# Patient Record
Sex: FEMALE | Race: WHITE
[De-identification: names, ages, dates, MRNs, and addresses within clinical notes are randomized per-mention and may not be internally consistent; named-entity substitution may affect disease eponyms.]

---

## 2017-10-25 ENCOUNTER — HOSPITAL ENCOUNTER (EMERGENCY)
Dept: HOSPITAL 10 - FTE | Age: 22
Discharge: HOME | End: 2017-10-25
Payer: COMMERCIAL

## 2017-10-25 VITALS
WEIGHT: 137.79 LBS | HEIGHT: 67 IN | BODY MASS INDEX: 21.63 KG/M2 | WEIGHT: 137.79 LBS | BODY MASS INDEX: 21.63 KG/M2 | HEIGHT: 67 IN

## 2017-10-25 DIAGNOSIS — K29.70: Primary | ICD-10-CM

## 2017-10-25 LAB — URINE BLOOD (DIP) POC: (no result)

## 2017-10-25 PROCEDURE — 99283 EMERGENCY DEPT VISIT LOW MDM: CPT

## 2017-10-25 PROCEDURE — 81003 URINALYSIS AUTO W/O SCOPE: CPT

## 2017-10-25 NOTE — ERD
ER Documentation


Chief Complaint


Chief Complaint


UPPER LT ABD PAIN





HPI


22-year-old  female, previously healthy, presents to the emergency 

department with her mother, complaining of 1 week of intermittent abdominal pain

, located on the left upper quadrant, described as sharp and sometimes burning.

  The pain is 5 out of 10, and it seems that it gets worse by stress.  The 

patient denies fever, chills, nausea, diarrhea or vomiting.  No urinary 

symptoms.  No treatment attempted at this time





ROS


SYSTEMIC symptoms:  No fever, no chills, no night sweats 


EYE symptoms:   No eyesight problems.


OTOLARYNGEAL symptoms:   No hearing loss.


CARDIOVASCULAR symptoms:   No chest pain or discomfort, no palpitations.


PULMONARY symptoms:   No dyspnea, no cough, no wheezing.


GASTROINTESTINAL symptoms:   abdominal pain, no nausea, no vomiting 


SKIN no rashes


MUSCULOSKELETAL symptoms:   No arthralgias, no muscle aches.


NEUROLOGY symptoms: No headache, no confusion, no syncope, no numbness or 

tingling.


All systems reviewed and are negative except as per history of present illness.





Medications


Home Meds


Active Scripts


Acetaminophen* (Tylenol*) 325 Mg Tablet, 1 TAB PO Q6 Y for PAIN AND OR ELEVATED 

TEMP, #20 TAB


   Prov:PELON GIBSON MD         10/25/17


Ranitidine Hcl* (Zantac*) 150 Mg Tablet, 150 MG PO BID Y for EPIGASTRIC PAIN, #

30 TAB


   Prov:PELON GIBSON MD         10/25/17





Allergies


Allergies:  


Coded Allergies:  


     No Known Allergy (Unverified , 10/25/17)





Physical Exam


Vitals





Vital Signs








  Date Time  Temp Pulse Resp B/P Pulse Ox O2 Delivery O2 Flow Rate FiO2


 


10/25/17 08:48 98.1 100 17 128/76 98   








Physical Exam


Patient is in no acute distress, vital signs stable. Alert and fully oriented. 


EYES: PERRLA, EOMI, Sclera and conjunctiva appear normal. 


EARS: Canals clear, tympanic membranes WNL


THROAT: Normal oropharynx. 


NECK: Supple, No lymphadenopathy. Full ROM without pain or tenderness.


HEART:  RRR, no rubs, murmurs, clicks or gallops.


LUNGS: Clear to auscultation.


ABDOMEN: Soft, non-tender without masses or hepatosplenomegaly. No peritoneal 

signs


EXTREMITIES: No edema bilaterally.


MUSC: Full ROM, no deformity, normal back exam


Results 24 hrs





 Laboratory Tests








Test


  10/25/17


09:54


 


Bedside Urine pH (LAB) 6.5 


 


Bedside Urine Protein (LAB) Negative 


 


Bedside Urine Glucose (UA) Negative 


 


Bedside Urine Ketones (LAB) Negative 


 


Bedside Urine Blood 2+ 


 


Bedside Urine Nitrite (LAB) Negative 


 


Bedside Urine Leukocyte


Esterase (L Trace 


 











Procedures/MDM


22-year-old female, previously healthy, presents with left upper abdominal pain

, sharp, sometimes burning, for the last week.  Differential diagnosis includes 

gastritis, kidney stone, UTI, less likely pancreatitis, appendicitis.  Physical 

examination unremarkable, abdomen benign, urine showed mild traces of blood 

which are not indicative for UTI most likely clinical presentation consistent 

with gastritis.  The patient will be discharged home with a prescription for 

ranitidine and acetaminophen as needed for pain and follow-up with her primary 

physician in 2-4 days.  The patient was instructed to return to the emergency 

department for persistent or worsening of symptoms





Departure


Diagnosis:  


 Primary Impression:  


 Abdominal pain


 Additional Impression:  


 Gastritis


Condition:  Stable


Patient Instructions:  Abdominal Pain, Understanding Gastritis





Additional Instructions:  


Thank you very much for allowing us to participate in your care. It was a 

pleasure seen you today here at [facility].


Please schedule a follow up appointment with your primary doctor in 2 days and 

bring all the information and prescriptions that we have given to you today. If 

the doctor is unavailable and the symptoms persist or worsen, the patient 

should return to the hospital immediately.











PELON GIBSON MD Oct 25, 2017 09:10

## 2018-11-27 ENCOUNTER — HOSPITAL ENCOUNTER (EMERGENCY)
Age: 23
Discharge: HOME | End: 2018-11-27

## 2018-11-29 ENCOUNTER — HOSPITAL ENCOUNTER (EMERGENCY)
Dept: HOSPITAL 91 - FTE | Age: 23
Discharge: HOME | End: 2018-11-29
Payer: COMMERCIAL

## 2018-11-29 ENCOUNTER — HOSPITAL ENCOUNTER (EMERGENCY)
Age: 23
Discharge: HOME | End: 2018-11-29

## 2018-11-29 DIAGNOSIS — R07.89: ICD-10-CM

## 2018-11-29 DIAGNOSIS — R00.2: Primary | ICD-10-CM

## 2018-11-29 PROCEDURE — 99283 EMERGENCY DEPT VISIT LOW MDM: CPT
